# Patient Record
Sex: FEMALE | Race: WHITE | Employment: FULL TIME | URBAN - METROPOLITAN AREA
[De-identification: names, ages, dates, MRNs, and addresses within clinical notes are randomized per-mention and may not be internally consistent; named-entity substitution may affect disease eponyms.]

---

## 2021-01-04 ENCOUNTER — HOSPITAL ENCOUNTER (EMERGENCY)
Age: 51
Discharge: HOME | End: 2021-01-04
Attending: EMERGENCY MEDICINE
Payer: COMMERCIAL

## 2021-01-04 ENCOUNTER — APPOINTMENT (OUTPATIENT)
Dept: GENERAL RADIOLOGY | Age: 51
End: 2021-01-04
Payer: COMMERCIAL

## 2021-01-04 VITALS
SYSTOLIC BLOOD PRESSURE: 143 MMHG | WEIGHT: 119 LBS | DIASTOLIC BLOOD PRESSURE: 82 MMHG | HEART RATE: 82 BPM | RESPIRATION RATE: 18 BRPM | BODY MASS INDEX: 21.9 KG/M2 | TEMPERATURE: 98.4 F | HEIGHT: 62 IN | OXYGEN SATURATION: 96 %

## 2021-01-04 DIAGNOSIS — Z20.822 COVID-19 VIRUS TEST RESULT UNKNOWN: ICD-10-CM

## 2021-01-04 DIAGNOSIS — J06.9 VIRAL URI WITH COUGH: Primary | ICD-10-CM

## 2021-01-04 LAB
SARS-COV-2, RAPID: NORMAL
SARS-COV-2: NORMAL
SARS-COV-2: NOT DETECTED
SOURCE: NORMAL

## 2021-01-04 PROCEDURE — 71045 X-RAY EXAM CHEST 1 VIEW: CPT

## 2021-01-04 PROCEDURE — U0003 INFECTIOUS AGENT DETECTION BY NUCLEIC ACID (DNA OR RNA); SEVERE ACUTE RESPIRATORY SYNDROME CORONAVIRUS 2 (SARS-COV-2) (CORONAVIRUS DISEASE [COVID-19]), AMPLIFIED PROBE TECHNIQUE, MAKING USE OF HIGH THROUGHPUT TECHNOLOGIES AS DESCRIBED BY CMS-2020-01-R: HCPCS

## 2021-01-04 PROCEDURE — 99282 EMERGENCY DEPT VISIT SF MDM: CPT

## 2021-01-04 RX ORDER — CLONAZEPAM 0.5 MG/1
TABLET ORAL 2 TIMES DAILY PRN
COMMUNITY

## 2021-01-04 RX ORDER — BENZONATATE 100 MG/1
100-200 CAPSULE ORAL 3 TIMES DAILY PRN
Qty: 40 CAPSULE | Refills: 0 | Status: SHIPPED | OUTPATIENT
Start: 2021-01-04 | End: 2021-01-11

## 2021-01-04 ASSESSMENT — PAIN SCALES - GENERAL: PAINLEVEL_OUTOF10: 3

## 2021-01-04 ASSESSMENT — ENCOUNTER SYMPTOMS
DIARRHEA: 0
NAUSEA: 0
COUGH: 1
VOMITING: 0
RHINORRHEA: 1
SORE THROAT: 0
SHORTNESS OF BREATH: 1
ABDOMINAL PAIN: 0
SINUS PRESSURE: 0
COLOR CHANGE: 0

## 2021-01-04 NOTE — LETTER
[facility], [place] ED  [place] [address]  Phone: [phone]             January 4, 2021    Patient: Mel Bruno   YOB: 1970   Date of Visit: 1/4/2021       To Whom It May Concern:    Mel Bruno was seen and treated in our emergency department on 1/4/2021. Please excuse her from work 1/4/21 through 1/6/21.     Sincerely,             Signature:__________________________________

## 2021-01-04 NOTE — ED PROVIDER NOTES
[facility] ED  eMERGENCY dEPARTMENT eNCOUnter      Pt Name: Batsheva Velez  MRN: [id number]  [place] 1970  Date of evaluation: 1/4/2021  Provider: ALEXANDER Wesley 8798       Chief Complaint   Patient presents with    Chest Pain    Cough    Shortness of Breath         HISTORY OF PRESENT ILLNESS  (Location/Symptom, Timing/Onset, Context/Setting, Quality, Duration, Modifying Factors, Severity.)   Batsheva Velez is a 45 y.o. female who presents to the emergency department via private auto for a productive cough, congestion/rhinorrhea. Onset was about a week ago. Reports chills and sweats. Denies fever, N/V/D. She is a smoker. Rates her pain 3/10 at this time. She was sent by her employer. Nursing Notes were reviewed. ALLERGIES     Patient has no known allergies. CURRENT MEDICATIONS       Previous Medications    CLONAZEPAM (KLONOPIN) 0.5 MG TABLET    Take by mouth 2 times daily as needed. PAST MEDICAL HISTORY   No past medical history on file. SURGICAL HISTORY     No past surgical history on file. FAMILY HISTORY     No family history on file. No family status information on file. SOCIAL HISTORY          REVIEW OF SYSTEMS    (2-9 systems for level 4, 10 or more for level 5)     Review of Systems   Constitutional: Positive for chills and diaphoresis. Negative for fatigue and fever. HENT: Positive for congestion and rhinorrhea. Negative for ear discharge, ear pain, postnasal drip, sinus pressure and sore throat. Respiratory: Positive for cough and shortness of breath. Cardiovascular: Negative for chest pain and palpitations. Gastrointestinal: Negative for abdominal pain, diarrhea, nausea and vomiting. Musculoskeletal: Negative for arthralgias, myalgias, neck pain and neck stiffness. Skin: Negative for color change and rash. Neurological: Negative for dizziness, weakness, light-headedness and headaches.      Except as noted above the remainder of the review of systems was reviewed and negative. PHYSICAL EXAM    (up to 7 for level 4, 8 or more for level 5)     ED Triage Vitals [01/04/21 1035]   BP Temp Temp Source Pulse Resp SpO2 Height Weight   (!) 143/82 98.4 °F (36.9 °C) Oral 82 18 96 % 5' 2\" (1.575 m) 119 lb (54 kg)     Physical Exam  Vitals signs reviewed. Constitutional:       General: She is not in acute distress. Appearance: She is well-developed. She is not diaphoretic. Comments: Pt is speaking in full sentences, handling secretions well. HENT:      Right Ear: External ear normal.      Left Ear: External ear normal.   Eyes:      General: No scleral icterus. Conjunctiva/sclera: Conjunctivae normal.   Neck:      Musculoskeletal: Neck supple. Vascular: No JVD. Cardiovascular:      Rate and Rhythm: Normal rate. Pulmonary:      Effort: Pulmonary effort is normal. No respiratory distress. Breath sounds: Normal breath sounds. Musculoskeletal:      Comments: Moves extremities   Skin:     General: Skin is warm and dry. Findings: No rash. Neurological:      Mental Status: She is alert and oriented to person, place, and time. Psychiatric:         Behavior: Behavior normal.         DIAGNOSTIC RESULTS     RADIOLOGY:   Non-plain film images such as CT, Ultrasound and MRI are read by the radiologist. [facility] radiographic images are visualized and preliminarily interpreted by the emergency physician with the below findings:    Interpretation per the Radiologist below, if available at the time of this note:    Xr Chest Portable    Result Date: 1/4/2021  EXAMINATION: ONE XRAY VIEW OF THE CHEST 1/4/2021 11:14 am COMPARISON: 09/18/2006 HISTORY: ORDERING SYSTEM PROVIDED HISTORY: cough FINDINGS: The lungs are without acute focal process. No effusion or pneumothorax. The cardiomediastinal silhouette is normal.  The osseous structures are intact without acute process.      Negative chest.       LABS:  Labs Reviewed COVID-19       All other labs were within normal range or not returned as of this dictation. EMERGENCY DEPARTMENT COURSE and DIFFERENTIAL DIAGNOSIS/MDM:   Vitals:    Vitals:    01/04/21 1035   BP: (!) 143/82   Pulse: 82   Resp: 18   Temp: 98.4 °F (36.9 °C)   TempSrc: Oral   SpO2: 96%   Weight: 119 lb (54 kg)   Height: 5' 2\" (1.575 m)       CLINICAL DECISION MAKING:  The patient presented alert with a nontoxic appearance and was seen in conjunction with Dr. Noelle Guevara. Chest x-ray was negative for acute findings. Covid-19 test is pending. Isolation and return precautions were provided. A prescription was written for tessalon perles. Follow up with pcp, return to ED if condition worsens. The patient's signs and symptoms are consistent with an acute mild viral illness. At this time there is significant evidence of Covid-19 community spread due to this pandemic, and I feel the patient most likely has mild Covid-19 or other viral illness. The patient is nontoxic and well appearing, no evidence of hypoxia or impending respiratory failure. The patient is tolerating PO. I do not feel the patient has evidence of significant dehydration or end organ failure at this time. The patient does not have risk factors for severe disease such as cardiovascular disease, hypertension, uncontrolled diabetes, chronic respiratory disease, underlying malignancy, immunocompromised, Age > 60 years. I do not feel the patient has an emergent medical condition at this time. Direct patient contact is avoided at this time due to the critically low supplies of PPE worldwide and an effort to tasia appropriate use of PPE. I performed a medical screening exam that is compliant with the Declaration of [place] [facility] in the [place], secondary to the Pandemic Infectious Disease of SARS-Coronavirus-2.      We are not testing for influenza or other viral etiologies at this time due to the significant evidence of virus coinfections during this pandemic. The patient is referred to appropriate outpatient follow up through their PCP or [facility]. I discussed with the patient home isolation measures, anticipatory guidance, discharge instructions, and to return immediately for worsening of symptoms. The patient is agreeable with this plan. FINAL IMPRESSION      1. Viral URI with cough    2.  COVID-19 virus test result unknown            DISPOSITION/PLAN   DISPOSITION Decision To Discharge 01/04/2021 11:27:50 AM      PATIENT REFERRED TO:   [address] [facility] [address]  [phone]    Schedule an appointment as soon as possible for a visit       [facility] ED  [address] [facility]  [phone]    If symptoms worsen, As needed      DISCHARGE MEDICATIONS:     New Prescriptions    BENZONATATE (TESSALON) 100 MG CAPSULE    Take 1-2 capsules by mouth 3 times daily as needed for Cough           (Please note that portions of this note were completed with a voice recognition program.  Efforts were made to edit the dictations but occasionally words are mis-transcribed.)    ALEXANDER Cedeño CNP, APRN - CNP  01/04/21 1131

## 2021-01-05 NOTE — ED PROVIDER NOTES
eMERGENCY dEPARTMENT eNCOUnter   Independent Attestation     Pt Name: Valentine Suarez  MRN: [id number]  [place] 1970  Date of evaluation: 1/5/21     Valentine Suarez is a 45 y.o. female with CC: Chest Pain, Cough, and Shortness of Breath      Based on the medical record the care appears appropriate. I was personally available for consultation in the Emergency Department.     Sejal Pryor MD  Attending Emergency Physician                    Sejal Pryor MD  01/05/21 1909

## 2021-11-09 ENCOUNTER — APPOINTMENT (OUTPATIENT)
Dept: GENERAL RADIOLOGY | Age: 51
End: 2021-11-09
Payer: COMMERCIAL

## 2021-11-09 ENCOUNTER — HOSPITAL ENCOUNTER (EMERGENCY)
Age: 51
Discharge: HOME | End: 2021-11-09
Attending: EMERGENCY MEDICINE
Payer: COMMERCIAL

## 2021-11-09 VITALS
RESPIRATION RATE: 16 BRPM | HEART RATE: 83 BPM | DIASTOLIC BLOOD PRESSURE: 82 MMHG | OXYGEN SATURATION: 95 % | HEIGHT: 62 IN | TEMPERATURE: 97.6 F | BODY MASS INDEX: 23 KG/M2 | SYSTOLIC BLOOD PRESSURE: 128 MMHG | WEIGHT: 125 LBS

## 2021-11-09 DIAGNOSIS — S52.123A: Primary | ICD-10-CM

## 2021-11-09 PROCEDURE — 73110 X-RAY EXAM OF WRIST: CPT

## 2021-11-09 PROCEDURE — 73080 X-RAY EXAM OF ELBOW: CPT

## 2021-11-09 PROCEDURE — 99283 EMERGENCY DEPT VISIT LOW MDM: CPT

## 2021-11-09 PROCEDURE — 73090 X-RAY EXAM OF FOREARM: CPT

## 2021-11-09 RX ORDER — HYDROCODONE BITARTRATE AND ACETAMINOPHEN 5; 325 MG/1; MG/1
1 TABLET ORAL EVERY 4 HOURS PRN
Qty: 20 TABLET | Refills: 0 | Status: SHIPPED | OUTPATIENT
Start: 2021-11-09 | End: 2021-11-14

## 2021-11-09 RX ORDER — NAPROXEN 500 MG/1
500 TABLET ORAL 2 TIMES DAILY WITH MEALS
Qty: 20 TABLET | Refills: 1 | Status: SHIPPED | OUTPATIENT
Start: 2021-11-09

## 2021-11-09 ASSESSMENT — PAIN SCALES - GENERAL: PAINLEVEL_OUTOF10: 5

## 2021-11-09 NOTE — Clinical Note
Sourav Raphael was seen and treated in our emergency department on 11/9/2021. She may return to work on 11/15/2021. If you have any questions or concerns, please don't hesitate to call.       Taylor Fishman MD

## 2021-11-10 NOTE — ED PROVIDER NOTES
[address] [facility]  eMERGENCY dEPARTMENT eNCOUnter      Pt Name: Gifty Nieves  MRN: [id number]  [place] 1970  Date of evaluation: 11/9/21      CHIEF COMPLAINT:  Chief Complaint   Patient presents with    Fall     fell off ladder putting up [employer] lights    Arm Injury     left       HISTORY OF PRESENT ILLNESS    Gifty Nieves is a 45 y.o. female who presents with complaint of pain in the left forearm and elbow after she fell when the ladder tipped over while she was attempting to hang [employer] decorations. She apparently landed on her outstretched left hand. She denies any other injury or complaint. She denies any numbness, weakness, tingling. Patient is right-hand dominant. Denies any pain in the hand, the wrist or the shoulder. Location/Symptom: See above  Timing/Onset: See above  Context/Setting: See above  Quality: See above  Duration: See above  Modifying Factors: See above  Severity: See above      REVIEW OF SYSTEMS       No numbness, weakness, tingling. PAST MEDICAL HISTORY   PMH:  has no past medical history on file. Surgical History:  has no past surgical history on file. Social History:  reports that she has been smoking. She does not have any smokeless tobacco history on file. She reports current alcohol use. She reports that she does not use drugs. Family History: Noncontributory at this time  Psychiatric History: Noncontributory at this time    Allergies:has No Known Allergies. PHYSICAL EXAM     INITIAL VITALS: /82   Pulse 83   Temp 97.6 °F (36.4 °C) (Oral)   Resp 16   Ht 5' 2\" (1.575 m)   Wt 125 lb (56.7 kg)   SpO2 95%   BMI 22.86 kg/m²   Patient is awake and alert. She is cooperative and responsive. Examination of the left upper extremity reveals no swelling, deformity, crepitus. Patient has she demonstrates normal range of motion of the hand on the wrist the forearm on the elbow and the arm on the shoulder.   Distal sensation, motor function, capillary refill, pulses are intact. Hand wrist forearm arm and shoulder are atraumatic and nontender. Maximal tenderness over the radial head with palpation and with supination and pronation of the forearm. There is a small palpable effusion of the elbow. DIAGNOSTIC RESULTS     EKG: All EKG's are interpreted by the Emergency Department Physician who either signs or Co-signs this chart in the absence of a cardiologist.  Not indicated    RADIOLOGY:   I directly visualized the following  images and reviewed the radiologist interpretations:  XR WRIST LEFT (MIN 3 VIEWS)   Final Result   Radial head fracture, joint effusion in the elbow      Otherwise, no other fractures or dislocation         XR RADIUS ULNA LEFT (2 VIEWS)   Final Result   Radial head fracture, joint effusion in the elbow      Otherwise, no other fractures or dislocation         XR ELBOW LEFT (MIN 3 VIEWS)   Final Result   Radial head fracture, joint effusion in the elbow      Otherwise, no other fractures or dislocation           Radiographs and the radiologist interpretation have been reviewed and do demonstrate a minimally displaced fracture of the radial head. LABS:  Labs Reviewed - No data to display  None    EMERGENCY DEPARTMENT COURSE:   -------------------------  Patient be placed in a sling. She will receive prescriptions for naproxen sodium and Norco and advised to follow-up with her orthopedist.  She is asked to return should her symptoms worsen or progress. CRITICAL CARE:     None    CONSULTS:    None    FINAL IMPRESSION      1.  Closed fracture of head of radius, initial encounter          DISPOSITION/PLAN:  DISPOSITION Decision To Discharge 11/09/2021 08:33:04 PM        PATIENT REFERRED TO:  Hermilo Hayes  [address] [place] [address] [phone]    Call in 1 day        DISCHARGE MEDICATIONS:  New Prescriptions    HYDROCODONE-ACETAMINOPHEN (NORCO) 5-325 MG PER TABLET    Take 1 tablet by mouth every 4 hours as needed for Pain for up to 5 days. Intended supply: 5 days. Take lowest dose possible to manage pain    NAPROXEN (NAPROSYN) 500 MG TABLET    Take 1 tablet by mouth 2 times daily (with meals)       (Please note that portions of this note were completed with a voice recognition program.  Efforts were made to edit the dictations but occasionally words are mis-transcribed. )    Srinivas Vasquez MD [facility]  Attending Emergency Physician        Srinivas Vasquez MD  11/09/21 2267

## 2024-07-19 ENCOUNTER — HOSPITAL ENCOUNTER (EMERGENCY)
Age: 54
Discharge: HOME | End: 2024-07-19
Attending: EMERGENCY MEDICINE
Payer: COMMERCIAL

## 2024-07-19 VITALS
HEART RATE: 90 BPM | TEMPERATURE: 97.3 F | SYSTOLIC BLOOD PRESSURE: 134 MMHG | OXYGEN SATURATION: 96 % | BODY MASS INDEX: 23.37 KG/M2 | RESPIRATION RATE: 20 BRPM | DIASTOLIC BLOOD PRESSURE: 90 MMHG | WEIGHT: 127 LBS | HEIGHT: 62 IN

## 2024-07-19 DIAGNOSIS — T63.441A BEE STING, ACCIDENTAL OR UNINTENTIONAL, INITIAL ENCOUNTER: Primary | ICD-10-CM

## 2024-07-19 PROCEDURE — 6370000000 HC RX 637 (ALT 250 FOR IP): Performed by: STUDENT IN AN ORGANIZED HEALTH CARE EDUCATION/TRAINING PROGRAM

## 2024-07-19 PROCEDURE — 6360000002 HC RX W HCPCS: Performed by: STUDENT IN AN ORGANIZED HEALTH CARE EDUCATION/TRAINING PROGRAM

## 2024-07-19 PROCEDURE — 99283 EMERGENCY DEPT VISIT LOW MDM: CPT

## 2024-07-19 RX ORDER — DIPHENHYDRAMINE HCL 25 MG
25 TABLET ORAL ONCE
Status: COMPLETED | OUTPATIENT
Start: 2024-07-19 | End: 2024-07-19

## 2024-07-19 RX ORDER — FAMOTIDINE 20 MG/1
20 TABLET, FILM COATED ORAL ONCE
Status: COMPLETED | OUTPATIENT
Start: 2024-07-19 | End: 2024-07-19

## 2024-07-19 RX ORDER — DEXAMETHASONE 4 MG/1
10 TABLET ORAL ONCE
Status: COMPLETED | OUTPATIENT
Start: 2024-07-19 | End: 2024-07-19

## 2024-07-19 RX ADMIN — FAMOTIDINE 20 MG: 20 TABLET, FILM COATED ORAL at 15:12

## 2024-07-19 RX ADMIN — DIPHENHYDRAMINE HYDROCHLORIDE 25 MG: 25 TABLET ORAL at 15:12

## 2024-07-19 RX ADMIN — DEXAMETHASONE 10 MG: 4 TABLET ORAL at 15:12

## 2024-07-19 ASSESSMENT — PAIN DESCRIPTION - PAIN TYPE: TYPE: ACUTE PAIN

## 2024-07-19 ASSESSMENT — ENCOUNTER SYMPTOMS
SHORTNESS OF BREATH: 0
WHEEZING: 0
SORE THROAT: 0
VOMITING: 0
NAUSEA: 0
ABDOMINAL PAIN: 0
SINUS PRESSURE: 0
DIARRHEA: 0
BACK PAIN: 0
CONSTIPATION: 0

## 2024-07-19 ASSESSMENT — PAIN - FUNCTIONAL ASSESSMENT: PAIN_FUNCTIONAL_ASSESSMENT: 0-10

## 2024-07-19 ASSESSMENT — PAIN SCALES - GENERAL: PAINLEVEL_OUTOF10: 3

## 2024-07-19 ASSESSMENT — PAIN DESCRIPTION - FREQUENCY: FREQUENCY: CONTINUOUS

## 2024-07-19 ASSESSMENT — PAIN DESCRIPTION - DESCRIPTORS: DESCRIPTORS: BURNING;ACHING

## 2024-07-19 ASSESSMENT — PAIN DESCRIPTION - ONSET: ONSET: SUDDEN

## 2024-07-19 ASSESSMENT — PAIN DESCRIPTION - ORIENTATION: ORIENTATION: RIGHT;LEFT

## 2024-07-19 ASSESSMENT — PAIN DESCRIPTION - LOCATION: LOCATION: ARM;LEG;FACE

## 2024-07-19 NOTE — ED PROVIDER NOTES
Benadryl  Outpatient follow-up as needed    Medical Decision Making  Risk  OTC drugs.  Prescription drug management.          Danette Gunter MD   Attending Emergency Physician    (Please note that portions of this note were completed with a voice recognition program. Efforts were made to edit the dictations but occasionally words are mis-transcribed.)            Danette Gunter MD  07/19/24 0098

## 2024-07-19 NOTE — DISCHARGE INSTRUCTIONS
You were seen in the emergency department for bee stings. You were given medications to help with possible reaction. If you were to have a severe reaction, would have likely already happened. Please follow up with primary care provider in the next 1-2 weeks for reevaluation. Please return for any new or worsening symptoms.

## 2024-07-19 NOTE — ED PROVIDER NOTES
[facility] ED  Emergency Department Encounter  Emergency Medicine Resident     Pt Name:Sharon Cast  MRN: [id number]  Birthdate 1970  Date of evaluation: 7/19/24  PCP:  Ibrahima Boggs MD  Note Started: 3:53 PM EDT      CHIEF COMPLAINT       Chief Complaint   Patient presents with    Insect Bite     Stung by approximately 20 bees while at work       HISTORY OF PRESENT ILLNESS  (Location/Symptom, Timing/Onset, Context/Setting, Quality, Duration, Modifying Factors, Severity.)      Sharon Cast is a 53 y.o. female who presents with bee stings.  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  States she was stung upwards of 20 times in the bilateral upper extremities, and left side of the face.  Denies any previous history of allergies.  States she does have some tingling at the tip of the tongue but otherwise does not endorse any facial swelling, sore throat, difficulty breathing or swallowing.  No abdominal pain, nausea, vomiting.  No other complaints at this time.  PAST MEDICAL / SURGICAL / SOCIAL / FAMILY HISTORY      has a past medical history of Anxiety.       has a past surgical history that includes Cervical conization w/ laser.      Social History     Socioeconomic History    Marital status: Single     Spouse name: Not on file    Number of children: Not on file    Years of education: Not on file    Highest education level: Not on file   Occupational History    Not on file   Tobacco Use    Smoking status: Every Day    Smokeless tobacco: Not on file   Vaping Use    Vaping Use: Not on file   Substance and Sexual Activity    Alcohol use: Yes    Drug use: Never    Sexual activity: Not on file   Other Topics Concern    Not on file   Social History Narrative    Not on file     Social Determinants of Health     Financial Resource Strain: Not on file   Food Insecurity: Not on file   Transportation Needs: Not on file   Physical Activity:  Abdomen is soft.      Tenderness: There is no abdominal tenderness.   Musculoskeletal:         General: Normal range of motion.      Cervical back: Normal range of motion and neck supple.      Right lower leg: No edema.      Left lower leg: No edema.   Skin:     General: Skin is warm and dry.      Comments: Multiple pinpoint wounds with very minimal surrounding erythema consistent with bee sting.   Neurological:      General: No focal deficit present.      Mental Status: She is alert and oriented to person, place, and time.   Psychiatric:         Mood and Affect: Mood normal.           DDX/DIAGNOSTIC RESULTS / EMERGENCY DEPARTMENT COURSE / MDM     Medical Decision Making  Patient is a 3-year-old female with above-stated medical history presents emergency department after being stung by bees multiple times.  Does not have an allergy.  Does have some localized irritation at the sting sites but otherwise no systemic complaints.  No concern for anaphylaxis.  Will treat with steroids, antihistamines.  Will watch patient in apartment to ensure that symptoms are not worsening.  Anticipate likely discharge for this patient.    Risk  OTC drugs.  Prescription drug management.        EKG      All EKG's are interpreted by the Emergency Department Physician who either signs or Co-signs this chart in the absence of a cardiologist.    EMERGENCY DEPARTMENT COURSE:  Patient reevaluated.  States she is feeling all right after treatment.  Still not having difficulty with breathing, swallowing, facial swelling.  Repeat physical exam is reassuring.  Patient discharged in stable condition.  He is given follow-up information with occupational health.  Patient will return for any new or worsening symptoms.         PROCEDURES:  None    CONSULTS:  None    CRITICAL CARE:  There was significant risk of life threatening deterioration of patient's condition requiring my direct management. Critical care time 0 minutes, excluding any documented